# Patient Record
Sex: FEMALE | Race: WHITE | Employment: OTHER | URBAN - METROPOLITAN AREA
[De-identification: names, ages, dates, MRNs, and addresses within clinical notes are randomized per-mention and may not be internally consistent; named-entity substitution may affect disease eponyms.]

---

## 2018-07-19 ENCOUNTER — HOSPITAL ENCOUNTER (OUTPATIENT)
Dept: WOUND CARE | Age: 83
Discharge: HOME | End: 2018-07-19
Payer: MEDICARE

## 2018-07-19 ENCOUNTER — HOSPITAL ENCOUNTER (OUTPATIENT)
Dept: GENERAL RADIOLOGY | Age: 83
Discharge: HOME | End: 2018-07-19
Attending: PODIATRIST
Payer: MEDICARE

## 2018-07-19 VITALS
TEMPERATURE: 98.1 F | DIASTOLIC BLOOD PRESSURE: 63 MMHG | HEART RATE: 59 BPM | RESPIRATION RATE: 16 BRPM | SYSTOLIC BLOOD PRESSURE: 185 MMHG

## 2018-07-19 DIAGNOSIS — L97.522 ULCER OF LEFT FOOT, WITH FAT LAYER EXPOSED (HCC): ICD-10-CM

## 2018-07-19 PROCEDURE — 74011000250 HC RX REV CODE- 250: Performed by: PODIATRIST

## 2018-07-19 PROCEDURE — 11042 DBRDMT SUBQ TIS 1ST 20SQCM/<: CPT

## 2018-07-19 PROCEDURE — 73630 X-RAY EXAM OF FOOT: CPT

## 2018-07-19 PROCEDURE — 99215 OFFICE O/P EST HI 40 MIN: CPT

## 2018-07-19 RX ORDER — LIDOCAINE HYDROCHLORIDE 20 MG/ML
JELLY TOPICAL
Status: COMPLETED | OUTPATIENT
Start: 2018-07-19 | End: 2018-07-19

## 2018-07-19 RX ORDER — WARFARIN SODIUM 5 MG/1
5 TABLET ORAL DAILY
COMMUNITY
End: 2021-07-08

## 2018-07-19 RX ADMIN — LIDOCAINE HYDROCHLORIDE 5 ML: 20 JELLY TOPICAL at 13:25

## 2018-07-19 NOTE — PROGRESS NOTES
Problem: General Wound Care  Goal: Interventions  Outcome: Progressing Towards Goal  Debridement performed.

## 2018-07-19 NOTE — PROGRESS NOTES
Discharge Condition:Stable  Ambulatory Status:Ambulatory  Discharge Destination:Home  Transportation: Private auto  Accompanied by: Self

## 2018-07-19 NOTE — WOUND CARE
Visit Vitals    /63 (BP 1 Location: Right arm, BP Patient Position: Sitting)    Pulse (!) 59    Temp 98.1 °F (36.7 °C)    Resp 16    Breastfeeding No        07/19/18 1317   Wound Foot Left;Dorsal   Date First Assessed/Time First Assessed: 07/19/18 1315   POA: Yes  Wound Type: Trauma  Location: Foot  Orientation: Left;Dorsal   DRESSING STATUS Clean, dry, and intact; Removed   DRESSING TYPE 4 x 4   Non-Pressure Injury Partial thickness (epider/derm)   Wound Length (cm) 1.3 cm   Wound Width (cm) 2 cm   Wound Depth (cm) 0.2   Wound Surface area (cm^2) 2.6 cm^2   Condition of Base Slough   Condition of Edges Rolled/curled   Drainage Amount  Small    Drainage Color Serous   Wound Odor None   Periwound Skin Condition Intact   Cleansing and Cleansing Agents  Normal saline
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
## 2018-07-26 ENCOUNTER — HOSPITAL ENCOUNTER (OUTPATIENT)
Dept: WOUND CARE | Age: 83
Discharge: HOME | End: 2018-07-26
Payer: MEDICARE

## 2018-07-26 VITALS
SYSTOLIC BLOOD PRESSURE: 143 MMHG | DIASTOLIC BLOOD PRESSURE: 72 MMHG | TEMPERATURE: 98.4 F | RESPIRATION RATE: 16 BRPM | HEART RATE: 53 BPM

## 2018-07-26 PROCEDURE — 11042 DBRDMT SUBQ TIS 1ST 20SQCM/<: CPT

## 2018-07-26 RX ORDER — LIDOCAINE HYDROCHLORIDE 20 MG/ML
JELLY TOPICAL
Status: DISPENSED | OUTPATIENT
Start: 2018-07-26 | End: 2018-07-27

## 2018-07-26 NOTE — WOUND CARE
Procedure Note:  Excisional debridement through sc fat  Location / Ulcer: left dorsal foot  Consent in chart. Anesthesia: lidocaine gel 2%  Instrument: jose maria  Residual necrosis: none  Bleeding: minimal  Hemostasis: pressure  Pre-Procedure Pain: 0  Post-Procedure Pain: 2  Area debrided < 20 cm sq. Pre-Debridement measurements:  1.3 x 2 x 0.2cm  Post-Debridement measurements: 1.3 x 2.1 x 0.2cm  This is part of a series of staged procedures in an attempt at limb salvage.

## 2018-07-26 NOTE — WOUND CARE
07/26/18 1321 Wound Foot Left;Dorsal  
Date First Assessed/Time First Assessed: 07/19/18 1315   POA: Yes  Wound Type: Trauma  Location: Foot  Orientation: Left;Dorsal  
DRESSING STATUS Clean, dry, and intact Wound Length (cm) 1 cm Wound Width (cm) 1.7 cm Wound Depth (cm) 0.2 Wound Surface area (cm^2) 1.7 cm^2 Condition of Base Granulation;Slough Drainage Amount  Small Drainage Color Serosanguinous Wound Odor None
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
## 2018-08-02 ENCOUNTER — HOSPITAL ENCOUNTER (OUTPATIENT)
Dept: WOUND CARE | Age: 83
Discharge: HOME | End: 2018-08-02
Payer: MEDICARE

## 2018-08-02 VITALS
HEART RATE: 53 BPM | DIASTOLIC BLOOD PRESSURE: 72 MMHG | TEMPERATURE: 98.6 F | SYSTOLIC BLOOD PRESSURE: 156 MMHG | RESPIRATION RATE: 16 BRPM

## 2018-08-02 PROCEDURE — 99214 OFFICE O/P EST MOD 30 MIN: CPT

## 2018-08-02 PROCEDURE — 74011000250 HC RX REV CODE- 250: Performed by: PODIATRIST

## 2018-08-02 RX ORDER — LIDOCAINE HYDROCHLORIDE 40 MG/ML
SOLUTION TOPICAL
Status: COMPLETED | OUTPATIENT
Start: 2018-08-02 | End: 2018-08-02

## 2018-08-02 RX ADMIN — LIDOCAINE HYDROCHLORIDE 10 ML: 40 SOLUTION TOPICAL at 14:00

## 2018-08-02 NOTE — WOUND CARE
Visit Vitals  /72 (BP 1 Location: Right arm, BP Patient Position: Sitting)  Pulse (!) 53  Temp 98.6 °F (37 °C)  Resp 16  
 
 
 08/02/18 1311 Wound Foot Left;Dorsal  
Date First Assessed/Time First Assessed: 07/19/18 1315   POA: Yes  Wound Type: Trauma  Location: Foot  Orientation: Left;Dorsal  
DRESSING STATUS Clean, dry, and intact DRESSING TYPE Aquacel;Gauze Wound Length (cm) 1 cm Wound Width (cm) 1.6 cm Wound Depth (cm) 0.1 Wound Surface area (cm^2) 1.6 cm^2 Condition of Base Eschar Drainage Amount  Scant Drainage Color Serous Wound Odor None Periwound Skin Condition Intact Cleansing and Cleansing Agents  Normal saline
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
## 2018-08-09 ENCOUNTER — HOSPITAL ENCOUNTER (OUTPATIENT)
Dept: WOUND CARE | Age: 83
Discharge: HOME | End: 2018-08-09
Payer: MEDICARE

## 2018-08-09 VITALS
TEMPERATURE: 98.5 F | DIASTOLIC BLOOD PRESSURE: 63 MMHG | RESPIRATION RATE: 16 BRPM | SYSTOLIC BLOOD PRESSURE: 156 MMHG | HEART RATE: 63 BPM

## 2018-08-09 PROCEDURE — 11042 DBRDMT SUBQ TIS 1ST 20SQCM/<: CPT

## 2018-08-09 NOTE — WOUND CARE
08/09/18 1311   Wound Foot Left;Dorsal   Date First Assessed/Time First Assessed: 07/19/18 1315   POA: Yes  Wound Type: Trauma  Location: Foot  Orientation: Left;Dorsal   DRESSING STATUS Clean, dry, and intact   Wound Length (cm) 0.4 cm   Wound Width (cm) 0.8 cm   Wound Depth (cm) 0.1   Wound Surface area (cm^2) 0.32 cm^2   Change in Wound Size % 87.69   Condition of Base Granulation   Drainage Amount  Scant   Drainage Color Serous   Wound Odor None
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
## 2018-08-23 ENCOUNTER — HOSPITAL ENCOUNTER (OUTPATIENT)
Dept: WOUND CARE | Age: 83
Discharge: HOME | End: 2018-08-23
Payer: MEDICARE

## 2018-08-23 VITALS
SYSTOLIC BLOOD PRESSURE: 131 MMHG | TEMPERATURE: 99.1 F | DIASTOLIC BLOOD PRESSURE: 73 MMHG | RESPIRATION RATE: 16 BRPM | HEART RATE: 65 BPM

## 2018-08-23 PROCEDURE — 11042 DBRDMT SUBQ TIS 1ST 20SQCM/<: CPT

## 2018-08-23 PROCEDURE — 74011000250 HC RX REV CODE- 250: Performed by: PODIATRIST

## 2018-08-23 RX ORDER — LIDOCAINE HYDROCHLORIDE 40 MG/ML
SOLUTION TOPICAL AS NEEDED
Status: DISCONTINUED | OUTPATIENT
Start: 2018-08-23 | End: 2018-08-27 | Stop reason: HOSPADM

## 2018-08-23 RX ADMIN — LIDOCAINE HYDROCHLORIDE: 40 SOLUTION TOPICAL at 13:31

## 2018-08-23 NOTE — WOUND CARE
Discharge Condition: Stable  Ambulatory Status: Ambulatory  Discharge Destination: work  Transportation: Personal car  Accompanied by: self
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
## 2018-08-23 NOTE — WOUND CARE
Visit Vitals    /73 (BP 1 Location: Right arm, BP Patient Position: Sitting)    Pulse 65    Temp 99.1 °F (37.3 °C)    Resp 16        08/23/18 1321   Wound Foot Left;Dorsal   Date First Assessed/Time First Assessed: 07/19/18 1315   POA: Yes  Wound Type: Trauma  Location: Foot  Orientation: Left;Dorsal   DRESSING STATUS Clean, dry, and intact   DRESSING TYPE Aquacel;Gauze   Wound Length (cm) 0.3 cm   Wound Width (cm) 0.6 cm   Wound Depth (cm) 0.1   Wound Surface area (cm^2) 0.18 cm^2   Condition of Base Granulation   Drainage Amount  Scant   Drainage Color Serous   Wound Odor None   Periwound Skin Condition Intact   Cleansing and Cleansing Agents  Normal saline

## 2018-09-06 ENCOUNTER — HOSPITAL ENCOUNTER (OUTPATIENT)
Dept: WOUND CARE | Age: 83
Discharge: HOME | End: 2018-09-06
Payer: MEDICARE

## 2018-09-06 VITALS
SYSTOLIC BLOOD PRESSURE: 145 MMHG | DIASTOLIC BLOOD PRESSURE: 74 MMHG | HEART RATE: 60 BPM | RESPIRATION RATE: 16 BRPM | TEMPERATURE: 98 F

## 2018-09-06 PROCEDURE — 74011000250 HC RX REV CODE- 250: Performed by: PODIATRIST

## 2018-09-06 PROCEDURE — 11042 DBRDMT SUBQ TIS 1ST 20SQCM/<: CPT

## 2018-09-06 RX ORDER — LIDOCAINE HYDROCHLORIDE 40 MG/ML
SOLUTION TOPICAL AS NEEDED
Status: DISCONTINUED | OUTPATIENT
Start: 2018-09-06 | End: 2018-09-10 | Stop reason: HOSPADM

## 2018-09-06 RX ADMIN — LIDOCAINE HYDROCHLORIDE: 40 SOLUTION TOPICAL at 13:28
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
## 2018-09-06 NOTE — WOUND CARE
Visit Vitals  /74 (BP 1 Location: Right arm, BP Patient Position: Sitting)  Pulse 60  Temp 98 °F (36.7 °C)  Resp 16  
 
 
 09/06/18 1324 Wound Foot Left;Dorsal  
Date First Assessed/Time First Assessed: 07/19/18 1315   POA: Yes  Wound Type: Trauma  Location: Foot  Orientation: Left;Dorsal  
Wound Length (cm) 0.2 cm Wound Width (cm) 0.2 cm Wound Depth (cm) 0.1 Wound Surface area (cm^2) 0.04 cm^2 Condition of Base Granulation Drainage Amount  Scant Drainage Color Serous Wound Odor None Cleansing and Cleansing Agents  Normal saline

## 2021-07-08 ENCOUNTER — OFFICE VISIT (OUTPATIENT)
Dept: NEUROLOGY | Age: 86
End: 2021-07-08
Payer: MEDICARE

## 2021-07-08 ENCOUNTER — DOCUMENTATION ONLY (OUTPATIENT)
Dept: NEUROLOGY | Age: 86
End: 2021-07-08

## 2021-07-08 VITALS
DIASTOLIC BLOOD PRESSURE: 80 MMHG | SYSTOLIC BLOOD PRESSURE: 126 MMHG | HEART RATE: 65 BPM | RESPIRATION RATE: 18 BRPM | OXYGEN SATURATION: 98 %

## 2021-07-08 DIAGNOSIS — M25.50 ARTHRALGIA, UNSPECIFIED JOINT: ICD-10-CM

## 2021-07-08 DIAGNOSIS — Z86.73 REMOTE HISTORY OF STROKE: Primary | ICD-10-CM

## 2021-07-08 DIAGNOSIS — R26.89 IMBALANCE: ICD-10-CM

## 2021-07-08 PROCEDURE — G8427 DOCREV CUR MEDS BY ELIG CLIN: HCPCS | Performed by: PSYCHIATRY & NEUROLOGY

## 2021-07-08 PROCEDURE — 99205 OFFICE O/P NEW HI 60 MIN: CPT | Performed by: PSYCHIATRY & NEUROLOGY

## 2021-07-08 PROCEDURE — G8421 BMI NOT CALCULATED: HCPCS | Performed by: PSYCHIATRY & NEUROLOGY

## 2021-07-08 PROCEDURE — G8536 NO DOC ELDER MAL SCRN: HCPCS | Performed by: PSYCHIATRY & NEUROLOGY

## 2021-07-08 PROCEDURE — G8432 DEP SCR NOT DOC, RNG: HCPCS | Performed by: PSYCHIATRY & NEUROLOGY

## 2021-07-08 PROCEDURE — 1090F PRES/ABSN URINE INCON ASSESS: CPT | Performed by: PSYCHIATRY & NEUROLOGY

## 2021-07-08 PROCEDURE — 1101F PT FALLS ASSESS-DOCD LE1/YR: CPT | Performed by: PSYCHIATRY & NEUROLOGY

## 2021-07-08 PROCEDURE — G8400 PT W/DXA NO RESULTS DOC: HCPCS | Performed by: PSYCHIATRY & NEUROLOGY

## 2021-07-08 RX ORDER — GUAIFENESIN 100 MG/5ML
81 LIQUID (ML) ORAL DAILY
COMMUNITY

## 2021-07-08 RX ORDER — AMLODIPINE BESYLATE 2.5 MG/1
TABLET ORAL DAILY
COMMUNITY

## 2021-07-08 NOTE — PROGRESS NOTES
Faxed PT referral to [address] [place] [address]. Requested records from 9/2020 from [address] [place] [address] and [place] [facility]. Confirmation was received.

## 2021-07-08 NOTE — PROGRESS NOTES
NEUROLOGY  NEW PATIENT EVALUATION/CONSULTATION       PATIENT NAME: Megan Dumont    MRN: [id number]    REASON FOR CONSULTATION: Left hemiparesis, imbalance    07/08/21      Previous records (physician notes, laboratory reports, and radiology reports) and imaging studies were reviewed and summarized. My recommendations will be communicated back to the patient's physician(s) via electronic medical record and/or by [facility] mail. HISTORY OF PRESENT ILLNESS:  Megan Dumont is a 82 y.o. right handed female presenting for evaluation of gradual worsening of her baseline left-sided weakness and imbalance. Patient reports remote stroke 9/28/20, seen at [facility] with residual L-HP/hemisensory deficit. She reports worsening imbalance since this time. She has used a cane and or walker to assist with ambulation since her stroke. No reported dysarthria/aphasia, vision deficits. She completed inpatient rehab following her recent infarction. She was discharged on Coumadin + ASA. She was previously on Coumadin for over 15 years for her Raynauds. After switching hematologist 3 months ago, she was transitioned to ASA 81mg. Denies h/o cardiac arrhythmia/Afib. She endorses chronic pain into the lower back, hips, knees and feet also contributing to above symptoms. She had to quit working due to her stroke.      PAST MEDICAL HISTORY:  Past Medical History:   Diagnosis Date    Arthritis     knees djd    GERD (gastroesophageal reflux disease)     Hypertension     Liver disease     Other ill-defined conditions(799.89)     hypercholesterolemia    Raynaud disease     Stroke ([facility] [address]     tia       PAST SURGICAL HISTORY:  Past Surgical History:   Procedure Laterality Date    HX CHOLECYSTECTOMY      HX HEENT  1/11    cataract removal    HX ORTHOPAEDIC      Hand surgery    ND APPENDECTOMY         FAMILY HISTORY:   Family History   Problem Relation Age of Onset    Heart Disease Mother     Dementia Mother     Headache Mother    Jenna Solano Heart Disease Father          SOCIAL HISTORY:  Social History     Socioeconomic History    Marital status:      Spouse name: Not on file    Number of children: Not on file    Years of education: Not on file    Highest education level: Not on file   Tobacco Use    Smoking status: Current Every Day Smoker     Packs/day: 0.25     Years: 50.00     Pack years: 12.50    Smokeless tobacco: Never Used   Substance and Sexual Activity    Alcohol use: No    Drug use: No     Social Determinants of Health     Financial Resource Strain:     Difficulty of Paying Living Expenses:    Food Insecurity:     Worried About Running Out of Food in the Last Year:     [address] in the Last Year:    Transportation Needs:     Lack of Transportation (Medical):  Lack of Transportation (Non-Medical):    Physical Activity:     Days of Exercise per Week:     Minutes of Exercise per Session:    Stress:     Feeling of Stress :    Social Connections:     Frequency of Communication with Friends and Family:     Frequency of Social Gatherings with Friends and Family:     Attends [employer] Services:     Active Member of Clubs or Organizations:     Attends Club or Organization Meetings:     Marital Status:          MEDICATIONS:   Current Outpatient Medications   Medication Sig Dispense Refill    aspirin 81 mg chewable tablet Take 81 mg by mouth daily.  amLODIPine (NORVASC) 2.5 mg tablet Take  by mouth daily.  ATENOLOL PO Take 50 mg by mouth daily. ALLERGIES:  Allergies   Allergen Reactions    Keflex [Cephalexin] Hives         REVIEW OF SYSTEMS:  10 point ROS reviewed with patient. Please see scanned document under media. PHYSICAL EXAM:  Vital Signs:   Visit Vitals  /80   Pulse 65   Resp 18   SpO2 98%        General Medical Exam:  General:  Well appearing, comfortable, in no apparent distress. Eyes/ENT: see cranial nerve examination. Neck: No masses appreciated.   Full range of motion without tenderness. Respiratory:  Clear to auscultation, good air entry bilaterally. Cardiac:  Regular rate and rhythm, no murmur. GI:  Soft, non-tender, non-distended abdomen. Bowel sounds normal. No masses, organomegaly. Extremities:  No deformities, edema, or skin discoloration. Skin:  No rashes or lesions. Neurological:  · Mental Status:  Alert and oriented to person, place, and time with fluent speech. · Cranial Nerves:   CNII/III/IV/VI: visual fields full to confrontation, EOMI, PERRL, no ptosis or nystagmus. CN V: Facial sensation intact bilaterally, masseter 5/5   CN VII: Facial muscles symmetric and strong   CN VIII: Hears finger rub well bilaterally, intact vestibular function   CN IX/X: Normal palatal movement   CN XI: Full strength shoulder shrug bilaterally   CN XII: Tongue protrusion full and midline without fasciculation or atrophy  · Motor: Normal tone and muscle bulk with no pronator drift. Individual muscle group testing:  Shoulder abduction:   Left:5/5   Right : 5/5    Shoulder adduction:   Left:5/5   Right : 5/5    Elbow flexion:      Left:5/5   Right : 5/5  Elbow extension:    Left:5/5   Right : 5/5   Wrist flexion:    Left:5/5   Right : 5/5  Wrist extension:    Left:5/5   Right : 5/5  Arm pronation:   Left:5/5   Right : 5/5  Arm supination:   Left:5/5   Right : 5/5    Finger flexion:    Left:5/5   Right : 5/5    Finger extension:   Left:5/5   Right : 5/5   Finger abduction:  Left:5/5   Right : 5/5   Finger adduction:   Left:5/5   Right : 5/5  Hip flexion:     Left:5/5   Right : 5/5         Hip extension:   Left:5/5   Right : 5/5    Knee flexion:    Left:5/5   Right : 5/5    Knee extension:   Left:5/5   Right : 5/5    Dorsiflexion:     Left:5/5   Right : 5/5  Plantar flexion:    Left:5/5   Right : 5/5      · MSRs: No crossed adductors or clonus.          RIGHT  LEFT   Brachioradialis 2+ 2+   Biceps 2+ 2+   Triceps 2+ 2+   Knee 2+ 2+   Achilles 2+ 2+        Plantar response Downward Downward          · Sensation: Decreased LT/temperature LUE/LLE, otherwise normal and symmetric perception of pinprick, proprioception, and vibration; (-) Romberg. · Coordination: No dysmetria. Normal rapid alternating movements; finger-to-nose and heel-to- shin testing are within normal limits. · Gait: Unsteady, uses cane to assist    PERTINENT DATA:  OUTSIDE RECORDS:  The patient provided outside medical records which were reviewed during the course of the visit. The relevant detail are summarized above. ASSESSMENT:      ICD-10-CM ICD-9-CM    1. Remote history of stroke  Z86.73 V12.54 MRI BRAIN WO CONT      REFERRAL TO PHYSICAL THERAPY   2. Imbalance  R26.89 781.2 MRI BRAIN WO CONT      REFERRAL TO PHYSICAL THERAPY   3. Arthralgia, unspecified joint  M25.50 719.40 REFERRAL TO RHEUMATOLOGY   82year old pleasant female referred for evaluation of perceived worsening of her baseline left sided deficits and imbalance s/p remote stroke 9/28/20 with prior evaluations at [facility]. She has used a cane and/or walker since her discharge but feels symptoms are worsening gradually with time. She also notes rather significant diffuse arthralgias confounding her clinical picture. On examination today, strength appears symmetric without significant left hemiparesis despite patient endorsing >50% reduction in strength on the left side. There is L hemisensory loss involving the LUE/LLE and slow/cautious, unsteady appearing gait. Will attempt to obtain outside admission records for review. Due to reported worsening deficits and gait instability, we will proceed with repeat neuroimaging to exclude any acute pathology or interval evolution of her prior infarction. She will continue with current antiplatelet therapy for stroke prevention.  It appears she was on Coumadin previously due to h/o Raynaud's which was recently transitioned to ASA monotherapy 3 months ago after establishing care with a different Hematologist. No reported h/o arrhythmia/atrial fibrillation. We will proceed with additional physical therapy to assist with her gait instability. Also discussed referral to Rheumatology for further evaluation/management of her diffuse arthralgias. PLAN:  · MRI Brain WO  · Obtain records [facility]) pertaining to recent stroke  · Continue ASA 81mg/daily for stroke prevention  · PT for gait instability  · Referral to Rheumatology for arthralgias    Follow-up and Dispositions    · Return in about 2 months (around 9/8/2021). I have discussed the diagnosis with the patient today and the intended plan as seen in the above orders with both the patient as well as referring provider and/or PCP via electronic correspondence. The patient has received an after-visit summary and questions were answered concerning future plans. I have discussed medication side effects and warnings with the patient as well. The duration of this appointment visit was 60 minutes spent on interview, examination, review of records/labs/imaging, counseling, explanation of diagnosis, planning of further management, documentation and coordination of care. Luz Layton Standing, DO  Staff Neurologist  Diplomate, [employer]       CC Referring provider:  Berta Wooten MD

## 2021-07-14 ENCOUNTER — TELEPHONE (OUTPATIENT)
Dept: NEUROLOGY | Age: 86
End: 2021-07-14

## 2021-07-14 NOTE — TELEPHONE ENCOUNTER
----- Message from Bari Essex sent at 7/14/2021 11:24 AM EDT -----  Regarding: Dr. Medina Johnson Message/Vendor Calls    Caller's first and last name: Pt      Reason for call: referral for PT at [facility] required yes/no and why: Yes      Best contact number(s): [phone]      Details to clarify the request: Pt is calling to check on the status of her referral for [facility] for PT. She said it has been over a week since she was seen by Dr. All Green and would like to know when she will be contacted for scheduling.       Mk Essex

## 2021-07-19 ENCOUNTER — HOSPITAL ENCOUNTER (OUTPATIENT)
Dept: MRI IMAGING | Age: 86
Discharge: HOME | End: 2021-07-19
Attending: PSYCHIATRY & NEUROLOGY
Payer: MEDICARE

## 2021-07-19 DIAGNOSIS — Z86.73 REMOTE HISTORY OF STROKE: ICD-10-CM

## 2021-07-19 DIAGNOSIS — R26.89 IMBALANCE: ICD-10-CM

## 2021-07-19 PROCEDURE — 70551 MRI BRAIN STEM W/O DYE: CPT

## 2021-08-03 ENCOUNTER — TELEPHONE (OUTPATIENT)
Dept: NEUROLOGY | Age: 86
End: 2021-08-03

## 2021-10-13 ENCOUNTER — DOCUMENTATION ONLY (OUTPATIENT)
Dept: NEUROLOGY | Age: 86
End: 2021-10-13

## 2021-10-13 ENCOUNTER — OFFICE VISIT (OUTPATIENT)
Dept: NEUROLOGY | Age: 86
End: 2021-10-13
Payer: MEDICARE

## 2021-10-13 VITALS
RESPIRATION RATE: 20 BRPM | SYSTOLIC BLOOD PRESSURE: 122 MMHG | HEART RATE: 68 BPM | BODY MASS INDEX: 27.32 KG/M2 | HEIGHT: 65 IN | DIASTOLIC BLOOD PRESSURE: 60 MMHG | WEIGHT: 164 LBS | OXYGEN SATURATION: 98 %

## 2021-10-13 DIAGNOSIS — M25.50 ARTHRALGIA, UNSPECIFIED JOINT: ICD-10-CM

## 2021-10-13 DIAGNOSIS — Z86.73 REMOTE HISTORY OF STROKE: Primary | ICD-10-CM

## 2021-10-13 DIAGNOSIS — R26.89 IMBALANCE: ICD-10-CM

## 2021-10-13 PROCEDURE — 99214 OFFICE O/P EST MOD 30 MIN: CPT | Performed by: PSYCHIATRY & NEUROLOGY

## 2021-10-13 PROCEDURE — 1090F PRES/ABSN URINE INCON ASSESS: CPT | Performed by: PSYCHIATRY & NEUROLOGY

## 2021-10-13 PROCEDURE — 1101F PT FALLS ASSESS-DOCD LE1/YR: CPT | Performed by: PSYCHIATRY & NEUROLOGY

## 2021-10-13 PROCEDURE — G8427 DOCREV CUR MEDS BY ELIG CLIN: HCPCS | Performed by: PSYCHIATRY & NEUROLOGY

## 2021-10-13 PROCEDURE — G8536 NO DOC ELDER MAL SCRN: HCPCS | Performed by: PSYCHIATRY & NEUROLOGY

## 2021-10-13 PROCEDURE — G8432 DEP SCR NOT DOC, RNG: HCPCS | Performed by: PSYCHIATRY & NEUROLOGY

## 2021-10-13 PROCEDURE — G8400 PT W/DXA NO RESULTS DOC: HCPCS | Performed by: PSYCHIATRY & NEUROLOGY

## 2021-10-13 PROCEDURE — G8419 CALC BMI OUT NRM PARAM NOF/U: HCPCS | Performed by: PSYCHIATRY & NEUROLOGY

## 2021-10-13 NOTE — PROGRESS NOTES
Neurology Clinic Follow up Note    Patient ID:  Alonzo Lowe  [id number]  82 y.o.  1935      Ms. Koroma is here for follow up today of stroke. Last Appointment With Me:  7/8/2021     Ej Crespo is a 82 y.o. right handed female presenting for evaluation of gradual worsening of her baseline left-sided weakness and imbalance. Patient reports remote stroke 9/28/20, seen at [facility] with residual L-HP/hemisensory deficit. She reports worsening imbalance since this time. She has used a cane and or walker to assist with ambulation since her stroke. No reported dysarthria/aphasia, vision deficits. She completed inpatient rehab following her recent infarction. She was discharged on Coumadin + ASA. She was previously on Coumadin for over 15 years for her Raynauds. After switching hematologist 3 months ago, she was transitioned to ASA 81mg. Denies h/o cardiac arrhythmia/Afib. She endorses chronic pain into the lower back, hips, knees and feet also contributing to above symptoms. She had to quit working due to her stroke. \"   Interval History:   Patient returns for f/u of stroke, associated L-HP/hemisensory deficit. MRI Brain repeated after her last visit without acute intracranial pathology, noted remote R thalamic infarct and moderate microvascular ischemic changes. She is compliant with ASA daily for stroke prevention. Denies new focal weakness, numbness/paresthesias, speech or vision deficits. Still endorsing some imbalance, no falls. She did not feel PT was helpful. Dealing with some severe arthralgias, worsening since last visit. She is scheduled to see Rheumatology next week. PMHx/ PSHx/ FHx/ SHx:  Reviewed and unchanged previous visit.    Past Medical History:   Diagnosis Date    Arthritis     knees djd    GERD (gastroesophageal reflux disease)     Hypertension     Liver disease     Other ill-defined conditions(139.89)     hypercholesterolemia    Raynaud disease     Stroke ([facility] [address]     tia ROS:  Comprehensive review of systems negative except for as noted above. Objective:       Meds:  Current Outpatient Medications   Medication Sig Dispense Refill    aspirin 81 mg chewable tablet Take 81 mg by mouth daily.  amLODIPine (NORVASC) 2.5 mg tablet Take  by mouth daily.  ATENOLOL PO Take 50 mg by mouth daily. Exam:  Visit Vitals  /60   Pulse 68   Resp 20   Ht 5' 5\" (1.651 m)   Wt 164 lb (74.4 kg)   SpO2 98%   BMI 27.29 kg/m²     NEUROLOGICAL EXAM:  General: Awake, alert, speech fluent  CN: PERRL, EOMI without nystagmus, VFF to confrontation, facial sensation and strength are normal and symmetric, hearing is intact to finger rub bilaterally, palate and tongue movements are intact and symmetric. Motor: Normal tone, bulk and strength bilaterally. Reflexes: 2/4 and symmetric, plantar stimulation is flexor. Coordination: FNF, JANICE, HTS intact. Sensation: Decreased LT/temperature LUE/LLE  Gait: Unsteady, uses cane to assist due to knee OA    LABS  Results for orders placed or performed during the hospital encounter of 03/18/11   CULTURE, BLOOD, PAIRED    Specimen: Blood   Result Value Ref Range    Specimen Description: BLOOD     Special Requests: NO SPECIAL REQUESTS     Culture result: NO GROWTH 5 DAYS     Report Status 70/92/8130 FINAL    METABOLIC PANEL, COMPREHENSIVE   Result Value Ref Range    Sodium 140 136 - 145 MMOL/L    Potassium 4.0 3.5 - 5.1 MMOL/L    Chloride 103 97 - 108 MMOL/L    CO2 27 21 - 32 MMOL/L    Anion gap 10 5 - 15 mmol/L    Glucose 107 (H) 65 - 100 MG/DL    BUN 15 6 - 20 MG/DL    Creatinine 1.0 0.6 - 1.3 MG/DL    BUN/Creatinine ratio 15 12 - 20      GFR est AA >60 >60 ml/min/1.73m2    GFR est non-AA 58 (L) >60 ml/min/1.73m2    Calcium 9.0 8.5 - 10.1 MG/DL    Bilirubin, total 0.4 0.2 - 1.0 MG/DL    ALT (SGPT) 19 12 - 78 U/L    AST (SGOT) 7 (L) 15 - 37 U/L    Alk.  phosphatase 94 50 - 136 U/L    Protein, total 7.0 6.4 - 8.2 g/dL    Albumin 3.8 3.5 - 5.0 g/dL    Globulin 3.2 2.0 - 4.0 g/dL    A-G Ratio 1.2 1.1 - 2.2     CBC WITH AUTOMATED DIFF   Result Value Ref Range    WBC 7.3 3.6 - 11.0 K/uL    RBC 4.49 3.80 - 5.20 M/uL    HGB 13.3 11.5 - 16.0 g/dL    HCT 40.2 35.0 - 47.0 %    MCV 89.5 80.0 - 99.0 FL    MCH 29.6 26.0 - 34.0 PG    MCHC 33.1 30.0 - 36.5 g/dL    RDW 13.9 11.5 - 14.5 %    PLATELET 877 770 - 720 K/uL    NEUTROPHILS 69 32 - 75 %    LYMPHOCYTES 21 12 - 49 %    MONOCYTES 5 5 - 13 %    EOSINOPHILS 4 0 - 7 %    BASOPHILS 1 0 - 1 %    ABS. NEUTROPHILS 5.0 1.8 - 8.0 K/UL    ABS. LYMPHOCYTES 1.5 0.8 - 3.5 K/UL    ABS. MONOCYTES 0.4 0.0 - 1.0 K/UL    ABS. EOSINOPHILS 0.3 0.0 - 0.4 K/UL    ABS.  BASOPHILS 0.1 0.0 - 0.1 K/UL   PROTHROMBIN TIME   Result Value Ref Range    INR 1.1 0.9 - 1.1      Prothrombin time 11.1 (H) 9.0 - 11.0 SECS   PTT   Result Value Ref Range    aPTT 29.9 24.0 - 33.0 sec   TROPONIN I   Result Value Ref Range    Troponin-I, Qt. <0.04 <0.05 ng/mL   D DIMER   Result Value Ref Range    D-dimer 1.69 (H) 0.00 - 0.65 mg/L FEU   SED RATE (ESR)   Result Value Ref Range    Sed rate (ESR) 15 0 - 30 MM/HR   LIPID PANEL   Result Value Ref Range    Cholesterol, total 162 <200 MG/DL    Triglyceride 213 (H) <150 MG/DL    HDL Cholesterol 26 MG/DL    LDL, calculated 93.4 0 - 100 MG/DL    VLDL, calculated 42.6 MG/DL    CHOL/HDL Ratio 6.2 (H) 0 - 5.0     EKG, 12 LEAD, INITIAL   Result Value Ref Range    Ventricular Rate 80 BPM    Atrial Rate 80 BPM    P-R Interval 192 ms    QRS Duration 80 ms    Q-T Interval 366 ms    QTC Calculation (Bezet) 422 ms    Calculated P Axis 35 degrees    Calculated R Axis -30 degrees    Calculated T Axis 38 degrees    Diagnosis       Normal sinus rhythm  Left axis deviation  Inferior infarct (cited on or before 18-MAR-2011)  When compared with ECG of 22-MAR-2009 11:54,  No significant change was found  Confirmed by Akosua Cruz ([id number]) on 3/18/2011 5:02:31 PM       IMAGING:  MRI Results (most recent):  Results from [facility] Encounter encounter on 07/19/21    MRI BRAIN WO CONT    Narrative  CLINICAL HISTORY: Loss of balance. INDICATION: Loss of balance. COMPARISON: 2011    TECHNIQUE: MR examination of the brain includes axial and sagittal T1, coronal  T2, axial T2, axial FLAIR, axial gradient echo, axial DWI. CONTRAST: None    FINDINGS:  There is no intracranial mass, hemorrhage or evidence of acute infarction. There is sulcal and ventricular prominence. IACs are symmetric in size. Periventricular and scattered foci of increased T2 signal intensity in the  corona radiata and centrum semiovale. Increased T2 signal intensity within the  central alyssa as well. Remote lacunar infarction in the right basal ganglia. The brain architecture is normal. There is no evidence of midline shift or  mass-effect. There are no extra-axial fluid collections. There is no Chiari or  syrinx. The pituitary and infundibulum are grossly unremarkable. There is no  skull base mass. Cerebellopontine angles are grossly unremarkable. The major  intracranial vascular flow-voids are unremarkable. The cavernous sinuses are  symmetric. Optic chiasm and infundibulum grossly unremarkable. Orbits are  grossly symmetric. Dural venous sinuses are grossly patent. The mastoid air cells are well pneumatized and clear. Impression  Moderate chronic microvascular ischemic change with small remote lacunar  infarction in the right thalamus. Mild to moderate cerebral atrophy. There is no intracranial mass, hemorrhage or evidence of acute infarction. No acute intracranial process is demonstrated. Assessment:     Encounter Diagnoses     ICD-10-CM ICD-9-CM   1. Remote history of stroke  Z86.73 V12.54   2. Imbalance  R26.89 781.2   3. Arthralgia, unspecified joint  M25.50 70.40     82year old pleasant female here for f/u of perceived worsening of her baseline left sided deficits and imbalance s/p remote stroke 9/28/20 with prior evaluations at [facility].  She has used a cane and/or walker since her discharge but feels symptoms are worsening gradually with time. She also notes rather significant diffuse arthralgias confounding her clinical picture. On examination today, strength appears symmetric without significant left hemiparesis despite patient endorsing >50% reduction in strength on the left side. There is L hemisensory loss involving the LUE/LLE and slow/cautious, antalgic appearing gait. Outside admission records were requested for review but not yet received. Due to reported worsening deficits and gait instability, MRI brain was obtained after her initial visit showing no acute intracranial pathology, noted remote R thalamic lacunar infarction and moderate chronic microvascular ischemic changes. She will continue with current antiplatelet therapy for stroke prevention. It appears she was on Coumadin previously due to h/o Raynaud's which was recently transitioned to ASA monotherapy. No reported h/o arrhythmia/atrial fibrillation. She is doing well from a stroke perspective. Her main issue today appears to be ongoing significant arthralgias limiting ambulation. She has an upcoming appointment with Rheumatology for further evaluation/management of her diffuse arthralgias. Plan:   · Continue ASA 81mg/daily for stroke prevention  · Goal SBP<140, LDL<70  · Prior admission records pertaining to remote stroke have been requested  · Continue PT exercises for gait training/safety  · Referral to Rheumatology as previously placed for chronic arthralgias    Follow-up and Dispositions    · Return in about 6 months (around 4/13/2022). I have discussed the diagnosis with the patient today and the intended plan as seen in the above orders with both the patient as well as referring provider and/or PCP via electronic correspondence. The patient has received an after-visit summary and questions were answered concerning future plans.  I have discussed medication side effects and warnings with the patient as well.       Signed:  Jeromy Cat DO  10/13/2021  1:01 PM

## 2021-10-13 NOTE — LETTER
10/13/2021    Patient: Luciana Rodriguez   YOB: 1935   Date of Visit: 10/13/2021     Elvia Willett MD  [facility] [address]  Via Fax: [phone]    Dear Elvia Willett MD,      Thank you for referring Ms. Sherrie Mehta to [address] [place] [address] for evaluation. My notes for this consultation are attached. If you have questions, please do not hesitate to call me. I look forward to following your patient along with you.       Sincerely,    Jessi Jimenez, DO

## 2021-10-13 NOTE — PATIENT INSTRUCTIONS
[facility]   Statement to Patients  April 1, 2014      In an effort to ensure the large volume of patient prescription refills is processed in the most efficient and expeditious manner, we are asking our patients to assist us by calling your Pharmacy for all prescription refills, this will include also your  Mail Order Pharmacy. The pharmacy will contact our office electronically to continue the refill process. Please do not wait until the last minute to call your pharmacy. We need at least 48 hours (2days) to fill prescriptions. We also encourage you to call your pharmacy before going to  your prescription to make sure it is ready. With regard to controlled substance prescription refill requests (narcotic refills) that need to be picked up at our office, we ask your cooperation by providing us with at least 72 hours (3days) notice that you will need a refill. We will not refill narcotic prescription refill requests after 4:00pm on any weekday, Monday through Thursday, or after 2:00pm on Fridays, or on the weekends. We encourage everyone to explore another way of getting your prescription refill request processed using [employer], our patient web portal through our electronic medical record system. [employer] is an efficient and effective way to communicate your medication request directly to the office and  downloadable as an ly on your smart phone . [employer] also features a review functionality that allows you to view your medication list as well as leave messages for your physician. Are you ready to get connected? If so please review the attatched instructions or speak to any of our staff to get you set up right away! Thank you so much for your cooperation. Should you have any questions please contact our Practice Administrator.     The Physicians and Staff,  [facility]

## 2021-10-15 ENCOUNTER — TELEPHONE (OUTPATIENT)
Dept: RHEUMATOLOGY | Age: 86
End: 2021-10-15

## 2021-10-18 ENCOUNTER — OFFICE VISIT (OUTPATIENT)
Dept: RHEUMATOLOGY | Age: 86
End: 2021-10-18
Payer: MEDICARE

## 2021-10-18 VITALS
SYSTOLIC BLOOD PRESSURE: 175 MMHG | HEIGHT: 65 IN | WEIGHT: 157 LBS | HEART RATE: 45 BPM | TEMPERATURE: 98 F | RESPIRATION RATE: 18 BRPM | BODY MASS INDEX: 26.16 KG/M2 | DIASTOLIC BLOOD PRESSURE: 60 MMHG

## 2021-10-18 DIAGNOSIS — R52 DIFFUSE PAIN: Primary | ICD-10-CM

## 2021-10-18 PROCEDURE — 99204 OFFICE O/P NEW MOD 45 MIN: CPT | Performed by: INTERNAL MEDICINE

## 2021-10-18 PROCEDURE — 1090F PRES/ABSN URINE INCON ASSESS: CPT | Performed by: INTERNAL MEDICINE

## 2021-10-18 PROCEDURE — G8432 DEP SCR NOT DOC, RNG: HCPCS | Performed by: INTERNAL MEDICINE

## 2021-10-18 PROCEDURE — 1101F PT FALLS ASSESS-DOCD LE1/YR: CPT | Performed by: INTERNAL MEDICINE

## 2021-10-18 PROCEDURE — G8400 PT W/DXA NO RESULTS DOC: HCPCS | Performed by: INTERNAL MEDICINE

## 2021-10-18 PROCEDURE — G8536 NO DOC ELDER MAL SCRN: HCPCS | Performed by: INTERNAL MEDICINE

## 2021-10-18 PROCEDURE — G8419 CALC BMI OUT NRM PARAM NOF/U: HCPCS | Performed by: INTERNAL MEDICINE

## 2021-10-18 PROCEDURE — G0463 HOSPITAL OUTPT CLINIC VISIT: HCPCS | Performed by: INTERNAL MEDICINE

## 2021-10-18 PROCEDURE — G8427 DOCREV CUR MEDS BY ELIG CLIN: HCPCS | Performed by: INTERNAL MEDICINE

## 2021-10-18 RX ORDER — PREDNISONE 20 MG/1
20 TABLET ORAL
Qty: 7 TABLET | Refills: 0 | Status: SHIPPED | OUTPATIENT
Start: 2021-10-18

## 2021-10-18 NOTE — PROGRESS NOTES
REASON FOR VISIT    This is the initial evaluation for Ms. Koroma a 82 y.o. WHITE/NON- female for question of an inflammatory arthritis. The patient is referred to the [facility] at the request of Dr. Vaughn Gonzalez. HISTORY OF PRESENT ILLNESS      I have reviewed and summarized old records from [facility], [facility] and [facility]    In 7/19/2018, Left Foot radiograph showed Normal alignment. No evidence of acute fracture. Moderate degenerative disease of the first MTP joint. Plantar and Achilles calcaneal enthesophytes. No ankle joint effusion. Osteopenia. In 2019, she had left TKR. In 10/13/2021, she saw Dr. Creighton Mohs for long standing coumadin. Labs showed WBC 6.5, lymphocytes 2.9, Hct 32.6%, platelets 090,378. I spoke with who felt she had Valera's Disease (thromboangiitis obliterans) given her history of smoking and was managed on Coumadin, which he discontinued. She reported \"severe Raynaud's (white discoloration)\" that was pre-gangrenous with ulceration of the right 2nd digit and underwent arterectomy in 2011. She also had right hand digits that turn white when she was cold. This has since resolved after surgery. She is on amlodipine. Today, she complains of diffuse pain since 8/2021 that is an aching pain which is constant but worse in the worsen in the morning and improves with activity. She has morning stiffness lasting several hours to 2 hours. She takes a hot shower at night, which helps. She does not wake up at night due to pain. She does not APAP or NSAID. She notices her hands are becoming crooked. She also noticed right hip pain several weeks ago but is    She has anemia. She is under the care of neurology, Dr. Vaughn Gonzalez for imbalance.     Therapy History includes:  Current DMARD therapy includes: none  Prior DMARD therapy includes: none  The following DMARDs have been ineffective: none  The following DMARDs were stopped because of side effects: none    REVIEW OF SYSTEMS    A 15 point review of systems was performed and summarized below. The questionnaire was reviewed with the patient and scanned into the patient's medical record.     General: denies recent weight gain, recent weight loss, fatigue, weakness, fever, drenching night sweats  Musculoskeletal: endorses joint pain, morning stiffness (lasting 120 minutes), muscle pain, denies joint swelling  Ears: denies ringing in ears, hearing loss, deafness  Eyes: endorses foreign body sensation, denies pain, light sensitive, redness, blindness, double vision, blurred vision, excess tearing, dryness  Mouth: denies sore tongue, oral ulcers, loss of taste, dryness, increased dental caries  Nose: denies nosebleeds, nasal ulcers  Throat: denies food stuck when swallowing, difficulty with swallowing, hoarseness, pain in jaw while chewing  Neck: denies swollen glands, tender glands  Cardiopulmonary: denies pain in chest with deep breaths, pain in chest when lying down, murmurs, sudden changes in heart beat, wheezing, dry cough, productive cough, shortness of breath at rest, shortness of breath on exertion, coughing of blood  Gastrointestinal: denies nausea, heartburn, stomach pain relieved by food, chronic constipation, chronic diarrhea, blood in stools, black stools  Genitourinary: denies vaginal dryness, pain or burning on urination, blood in urine, cloudy urine, vaginal ulcers   Hematologic: denies anemia, bleeding tendency, blood clots, bleeding gums  Skin: endorses color changes of hands or feet in the cold (Raynaud's), denies easy bruising, hair loss, rash, rash worsened after sun exposure, hives/urticaria, skin thickening, skin tightness, nodules/bumps  Neurologic: endorses numbness or tingling in hands, numbness or tingling in feet, muscle weakness  Psychiatric: denies depression, excessive worries, PTSD, Bipolar  Sleep: endorses daytime somnolence, denies poor sleep (6-7 hours), snoring, apnea, difficulty falling asleep, difficulty staying asleep     PAST MEDICAL HISTORY    She has a past medical history of Arthritis, GERD (gastroesophageal reflux disease), Hypertension, Liver disease, Other ill-defined conditions(799.89), Raynaud disease, and Stroke ([address] FAMILY HISTORY    Her family history includes Arthritis-rheumatoid in her mother; Dementia in her mother; Headache in her mother; Heart Disease in her father and mother. SOCIAL HISTORY    She reports that she has quit smoking. She has a 12.50 pack-year smoking history. She has never used smokeless tobacco. She reports previous alcohol use. She reports that she does not use drugs. MEDICATIONS    Current Outpatient Medications   Medication Sig    predniSONE (DELTASONE) 20 mg tablet Take 20 mg by mouth daily (with breakfast).  aspirin 81 mg chewable tablet Take 81 mg by mouth daily.  amLODIPine (NORVASC) 2.5 mg tablet Take  by mouth daily.  ATENOLOL PO Take 50 mg by mouth daily. No current facility-administered medications for this visit. ALLERGIES  Allergies   Allergen Reactions    Keflex [Cephalexin] Hives       PHYSICAL EXAMINATION    Visit Vitals  BP (!) 175/60   Pulse (!) 45   Temp 98 °F (36.7 °C)   Resp 18   Ht 5' 5\" (1.651 m)   Wt 157 lb (71.2 kg)   BMI 26.13 kg/m²     Body mass index is 26.13 kg/m². General: Patient is alert, oriented x 3, not in acute distress    HEENT:   Conjunctiva are not injected and appear moist, there is no alopecia. Cardiovascular:  Heart is regular rate and rhythm, no murmurs. Chest:  Lungs are clear to auscultation bilaterally. Extremities:  Free of clubbing, cyanosis, edema, extremities well perfused. Neurological exam:  Muscle strength is full in upper and lower extremities. Skin exam:  There are no rashes, no tophi, no psoriasis, no livedo reticularis, no periungual erythema.     Telangectasia: left 3rd and 5th digits volar  Mild left hand with acrocyanosis with cold sensation    Musculoskeletal exam:  A comprehensive musculoskeletal exam was performed for all joints of each upper and lower extremity and assessed for swelling, tenderness and range of motion. Pertinent results are documented as below:    Bilateral Bhavana and Heberden nodes. Right knee crepitus without effusion. Left knee TKR  Decreased ROM of right hip    Z-Deformities:   no  Searcy Neck Deformities:  no  Boutonierre's Deformities:  no  Ulnar Deviation:   no  MCP Subluxation:  no    Joint Count 10/18/2021   Right wrist- Tender 1   Right wrist- Swollen 1   Tender Joint Count (Total) 1   Swollen Joint Count (Total) 1       DATA REVIEW    Prior medical records were reviewed and are summarized as below:    Laboratory data: summarized in the HPI    Imaging: summarized in the HPI. ASSESSMENT AND PLAN    1) Possible Polymyalgia Rheumatica. She is not a good historian. She complains of diffuse constant pain but notes it being worse in the morning and improving. She also has significant anemia which may be a marker for chronic inflammation. She had right wrist discomfort on exam without venita synovitis. I will give her prednisone 20 mg daily for 7 days and asked her to let me know how she feels in 4 days. I also ordered labs to be done today. 2) Anemia. This may be related to #1. 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 She no longer smokes. I discussed with dr. Sharyn Sánchez. The patient voiced understanding of the aforementioned assessment and plan. Summary of plan was provided in the After Visit Summary patient instructions. A total of 50 minutes was spent on this visit, reviewing interval notes, interval testing results, ordering tests, refilling medications, documenting the findings in the note, patient education, counseling, and coordination of care as described above. All questions asked and answered.     TODAY'S ORDERS    Orders Placed This Encounter    CYCLIC CITRUL PEPTIDE AB, IGG    METABOLIC PANEL, COMPREHENSIVE    C REACTIVE PROTEIN, QT    SED RATE (ESR)    PROTEIN ELECTROPHORESIS W/ REFLX DAVID    RHEUMATOID FACTOR BY TURB (RDL)    predniSONE (DELTASONE) 20 mg tablet     Future Appointments   Date Time Provider Daniela Mendosa   4/20/2022  3:00 PM Jefferson Gross, DO [facility]     Germania Babcock MD, [facility]    Adult Rheumatology   Rheumatology Ultrasound Certified  [facility]  A Part of [facility], [address] [place] [address]   Phone [phone]  Fax [phone]    Patient Care Team:  Jeffy Patrick MD as PCP - General (Family Medicine)

## 2021-10-18 NOTE — LETTER
10/18/2021    Patient: Joe Hernandez   YOB: 1935   Date of Visit: 10/18/2021     Mercy Padilla MD  [facility] [address]  Via Fax: [phone]     Michelle Cabral DO  [address] [address]  [place] [place]    Dear MD Michelle Lowry DO,      Thank you for referring Ms. Bradley Celestin to [address] [place] [address] for evaluation. My notes for this consultation are attached. If you have questions, please do not hesitate to call me. I look forward to following your patient along with you.       Sincerely,    Maryanne Domingo MD

## 2021-10-20 LAB
ALBUMIN SERPL ELPH-MCNC: 3.8 G/DL (ref 2.9–4.4)
ALBUMIN SERPL-MCNC: 4.2 G/DL (ref 3.6–4.6)
ALBUMIN/GLOB SERPL: 1.4 {RATIO} (ref 0.7–1.7)
ALBUMIN/GLOB SERPL: 1.8 {RATIO} (ref 1.2–2.2)
ALP SERPL-CCNC: 125 IU/L (ref 44–121)
ALPHA1 GLOB SERPL ELPH-MCNC: 0.3 G/DL (ref 0–0.4)
ALPHA2 GLOB SERPL ELPH-MCNC: 0.7 G/DL (ref 0.4–1)
ALT SERPL-CCNC: 9 IU/L (ref 0–32)
AST SERPL-CCNC: 13 IU/L (ref 0–40)
B-GLOBULIN SERPL ELPH-MCNC: 1 G/DL (ref 0.7–1.3)
BILIRUB SERPL-MCNC: 0.2 MG/DL (ref 0–1.2)
BUN SERPL-MCNC: 22 MG/DL (ref 8–27)
BUN/CREAT SERPL: 21 (ref 12–28)
CALCIUM SERPL-MCNC: 9.6 MG/DL (ref 8.7–10.3)
CCP IGA+IGG SERPL IA-ACNC: 7 UNITS (ref 0–19)
CHLORIDE SERPL-SCNC: 105 MMOL/L (ref 96–106)
CO2 SERPL-SCNC: 25 MMOL/L (ref 20–29)
CREAT SERPL-MCNC: 1.03 MG/DL (ref 0.57–1)
CRP SERPL-MCNC: 1 MG/L (ref 0–10)
ERYTHROCYTE [SEDIMENTATION RATE] IN BLOOD BY WESTERGREN METHOD: 8 MM/HR (ref 0–40)
GAMMA GLOB SERPL ELPH-MCNC: 0.8 G/DL (ref 0.4–1.8)
GLOBULIN SER CALC-MCNC: 2.3 G/DL (ref 1.5–4.5)
GLOBULIN SER CALC-MCNC: 2.7 G/DL (ref 2.2–3.9)
GLUCOSE SERPL-MCNC: 96 MG/DL (ref 65–99)
M PROTEIN SERPL ELPH-MCNC: NORMAL G/DL
PLEASE NOTE, 011150: NORMAL
POTASSIUM SERPL-SCNC: 5.2 MMOL/L (ref 3.5–5.2)
PROT PATTERN SERPL ELPH-IMP: NORMAL
PROT SERPL-MCNC: 6.5 G/DL (ref 6–8.5)
RHEUMATOID FACT SERPL-ACNC: <15 IU/ML (ref 0–15)
SODIUM SERPL-SCNC: 142 MMOL/L (ref 134–144)

## 2021-10-21 ENCOUNTER — TELEPHONE (OUTPATIENT)
Dept: RHEUMATOLOGY | Age: 86
End: 2021-10-21

## 2021-10-21 NOTE — TELEPHONE ENCOUNTER
Spoke with pt and let he know that we will send in leflunomide for her tomorrow and to take 1/2 tab a day for a week and if tolerated to increase to a full tab daily. She stated an understanding.

## 2021-10-21 NOTE — TELEPHONE ENCOUNTER
----- Message from Steven Tovar sent at 10/21/2021  1:10 PM EDT -----  Regarding: FW: /telephone  Contact: [phone]  This might be a duplicate message  ----- Message -----  From: Sofiya Michael  Sent: 10/21/2021   1:04 PM EDT  To: [facility] Front Office Pool  Subject: /telephone                               General Message/Vendor Calls    Caller's first and last name:      Reason for call: Pt called to inform  that medication has been working good      Callback required yes/no and why: Yes to talk to Dr Lane Alatorre contact number(s):[phone]      Details to clarify the request: Is unsure if she should keep taking it      Aime Roldan

## 2021-10-21 NOTE — TELEPHONE ENCOUNTER
I spoke with pt and she stated that since she has taken the prednisone on Monday she is feeling a whole lot better. She stated that her pain all over has significantly gone away except for the pain in her hip. Her wrist is much better also. I told her that I would let Dr. Geovanni Pardo know and call her back with the next treatment plan. She stated an understanding. Patient/Caregiver provided printed discharge information.

## 2021-10-21 NOTE — PROGRESS NOTES
The results were reviewed. Creatinine 1.03 mg/dL, eGFR 50. Elevated . All remaining labs are normal/negative.

## 2021-10-22 RX ORDER — LEFLUNOMIDE 20 MG/1
20 TABLET ORAL DAILY
Qty: 90 TABLET | Refills: 1 | Status: SHIPPED | OUTPATIENT
Start: 2021-10-22

## 2021-12-20 ENCOUNTER — TELEPHONE (OUTPATIENT)
Dept: RHEUMATOLOGY | Age: 86
End: 2021-12-20